# Patient Record
Sex: FEMALE | Race: WHITE
[De-identification: names, ages, dates, MRNs, and addresses within clinical notes are randomized per-mention and may not be internally consistent; named-entity substitution may affect disease eponyms.]

---

## 2022-11-03 ENCOUNTER — HOSPITAL ENCOUNTER (OUTPATIENT)
Dept: HOSPITAL 76 - DI | Age: 22
Discharge: HOME | End: 2022-11-03
Attending: PHYSICIAN ASSISTANT
Payer: COMMERCIAL

## 2022-11-03 DIAGNOSIS — K21.9: ICD-10-CM

## 2022-11-03 DIAGNOSIS — R10.11: Primary | ICD-10-CM

## 2022-11-03 DIAGNOSIS — Z79.899: ICD-10-CM

## 2022-11-03 DIAGNOSIS — Z91.018: ICD-10-CM

## 2022-11-03 NOTE — ULTRASOUND REPORT
PROCEDURE:  Abdomen Complete

 

INDICATIONS:  POSTPRANDIAL RUQ PAIN

 

TECHNIQUE:  

Real-time scanning was performed of the abdominal and retroperitoneal organs, with image documentatio
n.  

 

COMPARISON:  None.

 

FINDINGS:  

 

Liver:  Liver is mildly prominent measuring 17.1 cm with steatosis.

 

Gallbladder: No stones. Wall thickness measures 1.4 mm.

 

Biliary ducts:  Intrahepatic bile ducts are non-dilated.  Extrahepatic bile duct caliber measures 3.5
 mm.  Normal is 6-7 mm or less in diameter, or 10 mm or less post-cholecystectomy.  

 

Pancreas:  Visualized portions of the pancreas are sonographically normal.  

 

Spleen:  Spleen is normal in size and homogeneous in echotexture.  

 

Kidneys:  Kidneys are normal in size and echotexture.  Right kidney measures 9.3 cm long; left kidney
 measures 9.2 cm long.  No hydronephrosis or nephrolithiasis.  No solid masses.  

 

Aorta:  Visualized aorta is normal in caliber at less than 3 cm.  

 

Iliacs:  Proximal common iliac arteries are normal in caliber at less than 2.5 cm.  

 

IVC:  Intrahepatic inferior vena cava is patent.  

 

Miscellaneous:  No free abdominal fluid.  

 

IMPRESSION:  

Gallbladder is unremarkable.

 

Reviewed by: Otilia Hou MD on 11/3/2022 12:20 PM PDT

Approved by: Otilia Hou MD on 11/3/2022 12:20 PM PDT

 

 

Station ID:  [id number]